# Patient Record
Sex: MALE | Race: BLACK OR AFRICAN AMERICAN | NOT HISPANIC OR LATINO | Employment: FULL TIME | URBAN - METROPOLITAN AREA
[De-identification: names, ages, dates, MRNs, and addresses within clinical notes are randomized per-mention and may not be internally consistent; named-entity substitution may affect disease eponyms.]

---

## 2022-01-06 ENCOUNTER — HOSPITAL ENCOUNTER (EMERGENCY)
Facility: HOSPITAL | Age: 32
Discharge: HOME | End: 2022-01-06
Attending: EMERGENCY MEDICINE | Admitting: EMERGENCY MEDICINE
Payer: OTHER GOVERNMENT

## 2022-01-06 ENCOUNTER — APPOINTMENT (OUTPATIENT)
Dept: RADIOLOGY | Facility: HOSPITAL | Age: 32
End: 2022-01-06
Attending: EMERGENCY MEDICINE
Payer: OTHER GOVERNMENT

## 2022-01-06 ENCOUNTER — TELEPHONE (OUTPATIENT)
Dept: EMERGENCY MEDICINE | Facility: HOSPITAL | Age: 32
End: 2022-01-06

## 2022-01-06 VITALS
TEMPERATURE: 99 F | HEART RATE: 80 BPM | BODY MASS INDEX: 28.89 KG/M2 | OXYGEN SATURATION: 99 % | SYSTOLIC BLOOD PRESSURE: 115 MMHG | DIASTOLIC BLOOD PRESSURE: 90 MMHG | HEIGHT: 68 IN | RESPIRATION RATE: 16 BRPM

## 2022-01-06 DIAGNOSIS — E03.9 HYPOTHYROIDISM, UNSPECIFIED TYPE: ICD-10-CM

## 2022-01-06 DIAGNOSIS — R07.89 ATYPICAL CHEST PAIN: ICD-10-CM

## 2022-01-06 DIAGNOSIS — J02.9 PHARYNGITIS, UNSPECIFIED ETIOLOGY: ICD-10-CM

## 2022-01-06 LAB
ALBUMIN UR-MCNC: 10 MG/DL
ANION GAP SERPL CALCULATED.3IONS-SCNC: 8 MMOL/L (ref 5–18)
APPEARANCE UR: CLEAR
BASOPHILS # BLD AUTO: 0 10E3/UL (ref 0–0.2)
BASOPHILS NFR BLD AUTO: 1 %
BILIRUB UR QL STRIP: NEGATIVE
BUN SERPL-MCNC: 8 MG/DL (ref 8–22)
CALCIUM SERPL-MCNC: 9.1 MG/DL (ref 8.5–10.5)
CHLORIDE BLD-SCNC: 105 MMOL/L (ref 98–107)
CO2 SERPL-SCNC: 28 MMOL/L (ref 22–31)
COLOR UR AUTO: ABNORMAL
CREAT SERPL-MCNC: 1.19 MG/DL (ref 0.7–1.3)
EOSINOPHIL # BLD AUTO: 0 10E3/UL (ref 0–0.7)
EOSINOPHIL NFR BLD AUTO: 0 %
ERYTHROCYTE [DISTWIDTH] IN BLOOD BY AUTOMATED COUNT: 13.1 % (ref 10–15)
FLUAV RNA SPEC QL NAA+PROBE: NEGATIVE
FLUBV RNA RESP QL NAA+PROBE: NEGATIVE
GFR SERPL CREATININE-BSD FRML MDRD: 83 ML/MIN/1.73M2
GLUCOSE BLD-MCNC: 88 MG/DL (ref 70–125)
GLUCOSE UR STRIP-MCNC: NEGATIVE MG/DL
HCT VFR BLD AUTO: 45.7 % (ref 40–53)
HGB BLD-MCNC: 15.4 G/DL (ref 13.3–17.7)
HGB UR QL STRIP: ABNORMAL
IMM GRANULOCYTES # BLD: 0 10E3/UL
IMM GRANULOCYTES NFR BLD: 0 %
KETONES UR STRIP-MCNC: NEGATIVE MG/DL
LEUKOCYTE ESTERASE UR QL STRIP: NEGATIVE
LYMPHOCYTES # BLD AUTO: 1.7 10E3/UL (ref 0.8–5.3)
LYMPHOCYTES NFR BLD AUTO: 39 %
MCH RBC QN AUTO: 32.2 PG (ref 26.5–33)
MCHC RBC AUTO-ENTMCNC: 33.7 G/DL (ref 31.5–36.5)
MCV RBC AUTO: 95 FL (ref 78–100)
MONOCYTES # BLD AUTO: 0.7 10E3/UL (ref 0–1.3)
MONOCYTES NFR BLD AUTO: 16 %
MUCOUS THREADS #/AREA URNS LPF: PRESENT /LPF
NEUTROPHILS # BLD AUTO: 1.9 10E3/UL (ref 1.6–8.3)
NEUTROPHILS NFR BLD AUTO: 44 %
NITRATE UR QL: NEGATIVE
NRBC # BLD AUTO: 0 10E3/UL
NRBC BLD AUTO-RTO: 0 /100
PH UR STRIP: 5.5 [PH] (ref 5–7)
PLATELET # BLD AUTO: 218 10E3/UL (ref 150–450)
POTASSIUM BLD-SCNC: 3.9 MMOL/L (ref 3.5–5)
RBC # BLD AUTO: 4.79 10E6/UL (ref 4.4–5.9)
RBC URINE: 1 /HPF
SARS-COV-2 RNA RESP QL NAA+PROBE: POSITIVE
SODIUM SERPL-SCNC: 141 MMOL/L (ref 136–145)
SP GR UR STRIP: 1.03 (ref 1–1.03)
T4 FREE SERPL-MCNC: 0.76 NG/DL (ref 0.7–1.8)
TROPONIN I SERPL-MCNC: <0.01 NG/ML (ref 0–0.29)
TSH SERPL DL<=0.005 MIU/L-ACNC: 25.3 UIU/ML (ref 0.3–5)
UROBILINOGEN UR STRIP-MCNC: <2 MG/DL
WBC # BLD AUTO: 4.3 10E3/UL (ref 4–11)
WBC URINE: 1 /HPF

## 2022-01-06 PROCEDURE — 84484 ASSAY OF TROPONIN QUANT: CPT | Performed by: EMERGENCY MEDICINE

## 2022-01-06 PROCEDURE — 99285 EMERGENCY DEPT VISIT HI MDM: CPT | Mod: 25

## 2022-01-06 PROCEDURE — C9803 HOPD COVID-19 SPEC COLLECT: HCPCS

## 2022-01-06 PROCEDURE — 87491 CHLMYD TRACH DNA AMP PROBE: CPT | Performed by: EMERGENCY MEDICINE

## 2022-01-06 PROCEDURE — 87591 N.GONORRHOEAE DNA AMP PROB: CPT | Performed by: EMERGENCY MEDICINE

## 2022-01-06 PROCEDURE — 87636 SARSCOV2 & INF A&B AMP PRB: CPT | Performed by: EMERGENCY MEDICINE

## 2022-01-06 PROCEDURE — 80048 BASIC METABOLIC PNL TOTAL CA: CPT | Performed by: EMERGENCY MEDICINE

## 2022-01-06 PROCEDURE — 36415 COLL VENOUS BLD VENIPUNCTURE: CPT | Performed by: EMERGENCY MEDICINE

## 2022-01-06 PROCEDURE — 93005 ELECTROCARDIOGRAM TRACING: CPT | Performed by: EMERGENCY MEDICINE

## 2022-01-06 PROCEDURE — 84439 ASSAY OF FREE THYROXINE: CPT | Performed by: EMERGENCY MEDICINE

## 2022-01-06 PROCEDURE — 71046 X-RAY EXAM CHEST 2 VIEWS: CPT

## 2022-01-06 PROCEDURE — 84443 ASSAY THYROID STIM HORMONE: CPT | Performed by: EMERGENCY MEDICINE

## 2022-01-06 PROCEDURE — 81001 URINALYSIS AUTO W/SCOPE: CPT | Performed by: EMERGENCY MEDICINE

## 2022-01-06 PROCEDURE — 85025 COMPLETE CBC W/AUTO DIFF WBC: CPT | Performed by: EMERGENCY MEDICINE

## 2022-01-06 ASSESSMENT — ENCOUNTER SYMPTOMS
DIZZINESS: 0
DYSURIA: 0
SORE THROAT: 1
ABDOMINAL PAIN: 0
NAUSEA: 0
SHORTNESS OF BREATH: 0
FEVER: 0
JOINT SWELLING: 0
CONSTIPATION: 1
CONFUSION: 0
HEMATURIA: 0
VOMITING: 0
DIARRHEA: 0
CHILLS: 0

## 2022-01-06 NOTE — DISCHARGE INSTRUCTIONS
Follow up with primary clinic. Follow up on results via mychart as we discussed. Return for worsening concerns.

## 2022-01-06 NOTE — ED TRIAGE NOTES
Patient presents to ED with left sided chest pain and back pain.  Has recently started vaping.  Son has dry cough that started 4 days ago.  No new cough for patient.  States pain with deep breaths.  Constipated.

## 2022-01-06 NOTE — ED PROVIDER NOTES
Emergency Department Encounter     Evaluation Date & Time:   No admission date for patient encounter.    CHIEF COMPLAINT:  Chest Pain      Triage Note:Patient presents to ED with left sided chest pain and back pain.  Has recently started vaping.  Son has dry cough that started 4 days ago.  No new cough for patient.  States pain with deep breaths.  Constipated.                  ED COURSE & MEDICAL DECISION MAKING:     ED Course as of 01/06/22 1706   Thu Jan 06, 2022   1449 CXR (independent interpretation): no acute cardiopulmonary process    1636 Labs overall   1658 TSH appropriately elevated, reflex T4 pending. Trop neg.   1705 Pt updated, remains quite well here. Will follow up on rest of results via [employer].  Agreeable to plan.         Pt here with multiple complaints, appears to be more anxious about potential for something developing than anything else.  He recently started using a tobacco vape pen, and reports son recently sick with sore throat.  Vitals quite reassuring, well appearing. Will get labs, CXR, but I anticipate discharge.    At the conclusion of the encounter I discussed the results of all the tests and the disposition. The questions were answered. The patient or family acknowledged understanding and was agreeable with the care plan.      MEDICATIONS GIVEN IN THE EMERGENCY DEPARTMENT:  Medications - No data to display    NEW PRESCRIPTIONS STARTED AT TODAY'S ED VISIT:  New Prescriptions    No medications on file       HPI   HPI     Felix Costa is a 31 year old male with a pertinent history of Grave's disease off his synthroid who presents to this ED for evaluation of multiple symptoms.  Primarily concerned about possibility of covid as his son recently had sore throat (negative home test), but he's been having now sore throat, nose bleed (1 time), mild cough at night, left sided chest pain that's worse with palpation of area.  Denies n/v/d, loss of sense of taste/smell. Pt also requesting  "UA/STI testing, as well as thyroid testing. He is unvaccinated for covid.    REVIEW OF SYSTEMS:  Review of Systems   Constitutional: Negative for chills and fever.   HENT: Positive for congestion and sore throat.    Eyes: Negative for visual disturbance.   Respiratory: Negative for shortness of breath.    Cardiovascular: Positive for chest pain.   Gastrointestinal: Positive for constipation. Negative for abdominal pain, diarrhea, nausea and vomiting.   Endocrine: Negative for polyuria.   Genitourinary: Negative for dysuria and hematuria.        - urinary changes     Musculoskeletal: Negative for joint swelling.   Skin: Negative for rash.   Neurological: Negative for dizziness.   Psychiatric/Behavioral: Negative for confusion.   All other systems reviewed and are negative.          Medical History   No past medical history on file.    Past Surgical History:   Procedure Laterality Date     NO PAST SURGERIES         Family History   Problem Relation Age of Onset     Diabetes Paternal Grandmother        Social History     Tobacco Use     Smoking status: Current Every Day Smoker     Smokeless tobacco: Not on file   Substance Use Topics     Alcohol use: No     Drug use: No       ondansetron (ZOFRAN) 4 MG tablet        Physical Exam     Vitals:  BP (!) 115/90   Pulse 80   Temp 99  F (37.2  C) (Oral)   Resp 16   Ht 1.727 m (5' 8\")   SpO2 99%   BMI 28.89 kg/m      PHYSICAL EXAM:   Physical Exam  Vitals and nursing note reviewed.   Constitutional:       General: He is not in acute distress.     Appearance: Normal appearance.   HENT:      Head: Normocephalic and atraumatic.      Nose: Nose normal.      Mouth/Throat:      Mouth: Mucous membranes are moist.      Pharynx: Uvula midline. No pharyngeal swelling.      Tonsils: No tonsillar exudate or tonsillar abscesses.   Eyes:      Pupils: Pupils are equal, round, and reactive to light.   Cardiovascular:      Rate and Rhythm: Normal rate and regular rhythm.      Pulses: " Normal pulses.           Radial pulses are 2+ on the right side and 2+ on the left side.        Dorsalis pedis pulses are 2+ on the right side and 2+ on the left side.   Pulmonary:      Effort: Pulmonary effort is normal. No respiratory distress.      Breath sounds: Normal breath sounds.   Chest:      Comments: TTP left anterior chest wall, no rash or deformity  Abdominal:      Palpations: Abdomen is soft.      Tenderness: There is no abdominal tenderness.   Musculoskeletal:      Cervical back: Full passive range of motion without pain and neck supple.      Comments: No calf tenderness or swelling b/l   Skin:     General: Skin is warm.      Findings: No rash.   Neurological:      General: No focal deficit present.      Mental Status: He is alert. Mental status is at baseline.      Comments: Fluent speech, no acute lateralizing deficits   Psychiatric:         Mood and Affect: Mood normal.         Behavior: Behavior normal.         Results     LAB:  All pertinent labs reviewed and interpreted  Labs Ordered and Resulted from Time of ED Arrival to Time of ED Departure   TSH WITH FREE T4 REFLEX - Abnormal       Result Value    TSH 25.30 (*)    ROUTINE UA WITH MICROSCOPIC REFLEX TO CULTURE - Abnormal    Color Urine Light Yellow      Appearance Urine Clear      Glucose Urine Negative      Bilirubin Urine Negative      Ketones Urine Negative      Specific Gravity Urine 1.027      Blood Urine 0.03 mg/dL (*)     pH Urine 5.5      Protein Albumin Urine 10  (*)     Urobilinogen Urine <2.0      Nitrite Urine Negative      Leukocyte Esterase Urine Negative      Mucus Urine Present (*)     RBC Urine 1      WBC Urine 1     BASIC METABOLIC PANEL - Normal    Sodium 141      Potassium 3.9      Chloride 105      Carbon Dioxide (CO2) 28      Anion Gap 8      Urea Nitrogen 8      Creatinine 1.19      Calcium 9.1      Glucose 88      GFR Estimate 83     TROPONIN I - Normal    Troponin I <0.01     CBC WITH PLATELETS AND DIFFERENTIAL    WBC  Count 4.3      RBC Count 4.79      Hemoglobin 15.4      Hematocrit 45.7      MCV 95      MCH 32.2      MCHC 33.7      RDW 13.1      Platelet Count 218      % Neutrophils 44      % Lymphocytes 39      % Monocytes 16      % Eosinophils 0      % Basophils 1      % Immature Granulocytes 0      NRBCs per 100 WBC 0      Absolute Neutrophils 1.9      Absolute Lymphocytes 1.7      Absolute Monocytes 0.7      Absolute Eosinophils 0.0      Absolute Basophils 0.0      Absolute Immature Granulocytes 0.0      Absolute NRBCs 0.0     INFLUENZA A/B & SARS-COV2 PCR MULTIPLEX   T4 FREE   CHLAMYDIA TRACHOMATIS PCR   NEISSERIA GONORRHOEAE PCR       RADIOLOGY:  Chest XR,  PA & LAT   Final Result   IMPRESSION: Lungs are clear. No pleural effusion. Heart size and pulmonary vascularity within normal limits.                   ECG:  Sinus aida, rate 58, normal intervals, no acute ischemia, no priors for comparison    I have independently reviewed and interpreted the EKG(s) documented above     PROCEDURES:  Procedures:      FINAL IMPRESSION:    ICD-10-CM    1. Atypical chest pain  R07.89    2. Pharyngitis, unspecified etiology  J02.9    3. Hypothyroidism, unspecified type  E03.9            Shashi Rose DO  Emergency Medicine  [facility] EMERGENCY DEPARTMENT  1/6/2022  1:34 PM         Shashi Rose MD  01/06/22 1706

## 2022-01-07 LAB
ATRIAL RATE - MUSE: 58 BPM
C TRACH DNA SPEC QL NAA+PROBE: NEGATIVE
DIASTOLIC BLOOD PRESSURE - MUSE: NORMAL MMHG
INTERPRETATION ECG - MUSE: NORMAL
N GONORRHOEA DNA SPEC QL NAA+PROBE: NEGATIVE
P AXIS - MUSE: 42 DEGREES
PR INTERVAL - MUSE: 160 MS
QRS DURATION - MUSE: 90 MS
QT - MUSE: 374 MS
QTC - MUSE: 367 MS
R AXIS - MUSE: 43 DEGREES
SYSTOLIC BLOOD PRESSURE - MUSE: NORMAL MMHG
T AXIS - MUSE: 18 DEGREES
VENTRICULAR RATE- MUSE: 58 BPM

## 2022-01-07 NOTE — TELEPHONE ENCOUNTER
Coronavirus (COVID-19) Notification    Reason for call  Notify of POSITIVE  COVID-19 lab result, assess symptoms,  review [facility] recommendations    Lab Result   Lab test for 2019-nCoV rRt-PCR or SARS-COV-2 PCR  Oropharyngeal AND/OR nasopharyngeal swabs were POSITIVE for 2019-nCoV RNA [OR] SARS-COV-2 RNA (COVID-19) RNA     We have been unable to reach Patient by phone at this time to notify of their Positive COVID-19 result.  Left voicemail message requesting a call back to [phone] [facility] for results.        POSITIVE COVID-19 Letter sent.    Maria D Duncan LPN

## 2022-06-02 ENCOUNTER — HOSPITAL ENCOUNTER (EMERGENCY)
Facility: HOSPITAL | Age: 32
Discharge: HOME | End: 2022-06-02
Admitting: NURSE PRACTITIONER

## 2022-06-02 ENCOUNTER — APPOINTMENT (OUTPATIENT)
Dept: CT IMAGING | Facility: HOSPITAL | Age: 32
End: 2022-06-02
Attending: EMERGENCY MEDICINE

## 2022-06-02 VITALS
HEART RATE: 69 BPM | WEIGHT: 190 LBS | OXYGEN SATURATION: 99 % | DIASTOLIC BLOOD PRESSURE: 78 MMHG | SYSTOLIC BLOOD PRESSURE: 127 MMHG | BODY MASS INDEX: 28.89 KG/M2 | TEMPERATURE: 97.7 F | RESPIRATION RATE: 16 BRPM

## 2022-06-02 DIAGNOSIS — N20.0 KIDNEY STONE: ICD-10-CM

## 2022-06-02 LAB
ALBUMIN UR-MCNC: NEGATIVE MG/DL
ANION GAP SERPL CALCULATED.3IONS-SCNC: 8 MMOL/L (ref 5–18)
APPEARANCE UR: CLEAR
BASOPHILS # BLD AUTO: 0.1 10E3/UL (ref 0–0.2)
BASOPHILS NFR BLD AUTO: 0 %
BILIRUB UR QL STRIP: NEGATIVE
BUN SERPL-MCNC: 15 MG/DL (ref 8–22)
CALCIUM SERPL-MCNC: 9.8 MG/DL (ref 8.5–10.5)
CHLORIDE BLD-SCNC: 105 MMOL/L (ref 98–107)
CO2 SERPL-SCNC: 26 MMOL/L (ref 22–31)
COLOR UR AUTO: ABNORMAL
CREAT SERPL-MCNC: 1.19 MG/DL (ref 0.7–1.3)
EOSINOPHIL # BLD AUTO: 0 10E3/UL (ref 0–0.7)
EOSINOPHIL NFR BLD AUTO: 0 %
ERYTHROCYTE [DISTWIDTH] IN BLOOD BY AUTOMATED COUNT: 13 % (ref 10–15)
GFR SERPL CREATININE-BSD FRML MDRD: 83 ML/MIN/1.73M2
GLUCOSE BLD-MCNC: 92 MG/DL (ref 70–125)
GLUCOSE UR STRIP-MCNC: NEGATIVE MG/DL
HCT VFR BLD AUTO: 44.9 % (ref 40–53)
HGB BLD-MCNC: 15.2 G/DL (ref 13.3–17.7)
HGB UR QL STRIP: ABNORMAL
IMM GRANULOCYTES # BLD: 0 10E3/UL
IMM GRANULOCYTES NFR BLD: 0 %
KETONES UR STRIP-MCNC: NEGATIVE MG/DL
LEUKOCYTE ESTERASE UR QL STRIP: NEGATIVE
LYMPHOCYTES # BLD AUTO: 1.1 10E3/UL (ref 0.8–5.3)
LYMPHOCYTES NFR BLD AUTO: 9 %
MCH RBC QN AUTO: 32.9 PG (ref 26.5–33)
MCHC RBC AUTO-ENTMCNC: 33.9 G/DL (ref 31.5–36.5)
MCV RBC AUTO: 97 FL (ref 78–100)
MONOCYTES # BLD AUTO: 0.5 10E3/UL (ref 0–1.3)
MONOCYTES NFR BLD AUTO: 4 %
MUCOUS THREADS #/AREA URNS LPF: PRESENT /LPF
NEUTROPHILS # BLD AUTO: 11 10E3/UL (ref 1.6–8.3)
NEUTROPHILS NFR BLD AUTO: 87 %
NITRATE UR QL: NEGATIVE
NRBC # BLD AUTO: 0 10E3/UL
NRBC BLD AUTO-RTO: 0 /100
PH UR STRIP: 6.5 [PH] (ref 5–7)
PLATELET # BLD AUTO: 287 10E3/UL (ref 150–450)
POTASSIUM BLD-SCNC: 3.7 MMOL/L (ref 3.5–5)
RBC # BLD AUTO: 4.62 10E6/UL (ref 4.4–5.9)
RBC URINE: 3 /HPF
SODIUM SERPL-SCNC: 139 MMOL/L (ref 136–145)
SP GR UR STRIP: 1.02 (ref 1–1.03)
SQUAMOUS EPITHELIAL: <1 /HPF
UROBILINOGEN UR STRIP-MCNC: <2 MG/DL
WBC # BLD AUTO: 12.6 10E3/UL (ref 4–11)
WBC URINE: 1 /HPF

## 2022-06-02 PROCEDURE — 81001 URINALYSIS AUTO W/SCOPE: CPT | Performed by: EMERGENCY MEDICINE

## 2022-06-02 PROCEDURE — 80048 BASIC METABOLIC PNL TOTAL CA: CPT | Performed by: EMERGENCY MEDICINE

## 2022-06-02 PROCEDURE — 85025 COMPLETE CBC W/AUTO DIFF WBC: CPT | Performed by: EMERGENCY MEDICINE

## 2022-06-02 PROCEDURE — 36415 COLL VENOUS BLD VENIPUNCTURE: CPT | Performed by: EMERGENCY MEDICINE

## 2022-06-02 PROCEDURE — 74176 CT ABD & PELVIS W/O CONTRAST: CPT

## 2022-06-02 PROCEDURE — 99284 EMERGENCY DEPT VISIT MOD MDM: CPT | Mod: 25

## 2022-06-02 RX ORDER — ACETAMINOPHEN 500 MG
1000 TABLET ORAL EVERY 6 HOURS
Qty: 56 TABLET | Refills: 0 | Status: SHIPPED | OUTPATIENT
Start: 2022-06-02 | End: 2022-06-09

## 2022-06-02 RX ORDER — IBUPROFEN 200 MG
400 TABLET ORAL EVERY 6 HOURS
Qty: 56 TABLET | Refills: 0 | Status: SHIPPED | OUTPATIENT
Start: 2022-06-02 | End: 2022-06-09

## 2022-06-02 RX ORDER — DIMENHYDRINATE 50 MG
50 TABLET ORAL EVERY 6 HOURS PRN
Qty: 28 TABLET | Refills: 0 | Status: SHIPPED | OUTPATIENT
Start: 2022-06-02 | End: 2022-06-09

## 2022-06-02 RX ORDER — OXYCODONE HYDROCHLORIDE 5 MG/1
5 TABLET ORAL EVERY 4 HOURS PRN
Qty: 12 TABLET | Refills: 0 | Status: SHIPPED | OUTPATIENT
Start: 2022-06-02 | End: 2022-06-06

## 2022-06-02 RX ORDER — ONDANSETRON 4 MG/1
4 TABLET, ORALLY DISINTEGRATING ORAL EVERY 8 HOURS PRN
Qty: 10 TABLET | Refills: 0 | Status: SHIPPED | OUTPATIENT
Start: 2022-06-02 | End: 2022-06-06

## 2022-06-02 RX ORDER — ONDANSETRON 2 MG/ML
4 INJECTION INTRAMUSCULAR; INTRAVENOUS ONCE
Status: DISCONTINUED | OUTPATIENT
Start: 2022-06-02 | End: 2022-06-02 | Stop reason: HOSPADM

## 2022-06-02 RX ORDER — KETOROLAC TROMETHAMINE 30 MG/ML
15 INJECTION, SOLUTION INTRAMUSCULAR; INTRAVENOUS ONCE
Status: DISCONTINUED | OUTPATIENT
Start: 2022-06-02 | End: 2022-06-02 | Stop reason: HOSPADM

## 2022-06-02 ASSESSMENT — ENCOUNTER SYMPTOMS
DIAPHORESIS: 1
FLANK PAIN: 1
NAUSEA: 1
VOMITING: 0
DYSURIA: 0
ABDOMINAL PAIN: 1
HEMATURIA: 0

## 2022-06-02 NOTE — ED NOTES
ED Provider In Triage Note  [facility]  Encounter Date: Jun 2, 2022    Chief Complaint   Patient presents with     Flank Pain       Brief HPI:   Felix Costa is a 31 year old male presenting to the Emergency Department with a chief complaint of acute onset right flank/groin pain radiating into testicle around 10:15am today.  No treatment pta.  Reports n/v and symptoms similar to previous ureteral stone.      Brief Physical Exam:  /72   Pulse 62   Temp 97.7  F (36.5  C) (Tympanic)   Resp 12   Wt 86.2 kg (190 lb)   SpO2 100%   BMI 28.89 kg/m    General: Non-toxic appearing  HEENT: Atraumatic  Resp: No respiratory distress  Abdomen: Non-peritoneal  : no testicular tenderness/swelling  Neuro: Alert, oriented, answers questions appropriately  Psych: Behavior appropriate      Plan Initiated in Triage:  Orders Placed This Encounter     CT Abdomen Pelvis w/o Contrast     Basic metabolic panel     UA with Microscopic reflex to Culture     ketorolac (TORADOL) injection 15 mg     ondansetron (ZOFRAN) injection 4 mg       PIT Dispo:   Return to lobby while awaiting workup and ED bed availability.  No signs of testicular torsion    Shashi Rose MD on 6/2/2022 at 11:45 AM    Patient was evaluated by the Physician in Triage due to a limitation of available rooms in the Emergency Department. A plan of care was discussed based on the information obtained on the initial evaluation and patient was consuled to return back to the Emergency Department lobby after this initial evalutaiton until results were obtained or a room became available in the Emergency Department. Patient was counseled not to leave prior to receiving the results of their workup.     Shashi Rose MD  [facility] EMERGENCY DEPARTMENT  [address] [place] [id number]  [phone]     Shashi Rose MD  06/02/22 1148

## 2022-06-02 NOTE — DISCHARGE INSTRUCTIONS
CT scan today is showing the stone in your bladder.    You should take ibuprofen, 600mg, threetimes per day as needed for pain.  You can also use acetaminophen, 650 mg, up to four times per day as needed for pain.  You can use these medications together, there are no concerning interactions.      Take Zofran as needed for any ongoing nausea    Follow-up with your primary care doctor in 1 week for recheck.    Return for any new / worsening symptoms or for other concerns.

## 2022-06-02 NOTE — ED PROVIDER NOTES
EMERGENCY DEPARTMENT ENCOUNTER      NAME: Felix Costa  AGE: 31 year old male  YOB: 1990  MRN: [id number]  EVALUATION DATE & TIME: No admission date for patient encounter.    PCP: Sarabjit Carter    ED PROVIDER: LEE Valderrama, CNP      Chief Complaint   Patient presents with     Flank Pain         FINAL IMPRESSION:  1. Kidney stone          ED COURSE & MEDICAL DECISION MAKIN:07 PM I met with the patient, obtained history, performed an initial exam, and discussed options and plan for treatment here in the ED.  3:33 PM updated patient    Pertinent Labs & Imaging studies reviewed. (See chart for details)  31 year old male presents to the Emergency Department for evaluation of right flank pain.  CT of the abdomen and pelvis shows stone in the bladder.  Having some residual pain but overall seems comfortable.  UA does not suggest any infection.  Discussed expectant management given that the stone is in the bladder.  Will be sent home from the ED with a urine strainer.  Referral to [facility] was provided but may follow-up with PCP.  Additionally given return precautions    At the conclusion of the encounter I discussed the results of all of the tests and the disposition. The questions were answered. The patient or family acknowledged understanding and was agreeable with the care plan.         MEDICATIONS GIVEN IN THE EMERGENCY:  Medications   ketorolac (TORADOL) injection 15 mg (has no administration in time range)   ondansetron (ZOFRAN) injection 4 mg (has no administration in time range)       NEW PRESCRIPTIONS STARTED AT TODAY'S ER VISIT  New Prescriptions    ACETAMINOPHEN (TYLENOL) 500 MG TABLET    Take 2 tablets (1,000 mg) by mouth every 6 hours for 7 days    DIMENHYDRINATE (DRAMAMINE) 50 MG TABLET    Take 1 tablet (50 mg) by mouth every 6 hours as needed for other (kidney stone pain management)    IBUPROFEN (ADVIL/MOTRIN) 200 MG TABLET    Take 2 tablets (400 mg) by mouth every 6 hours  for 7 days    OXYCODONE (ROXICODONE) 5 MG TABLET    Take 1 tablet (5 mg) by mouth every 4 hours as needed for severe pain If pain is not improved with acetaminophen and ibuprofen.            =================================================================    HPI    Patient information was obtained from: Patient    Use of Intrepreter: N/A         Felix Costa is a 31 year old male with a history of kidney stones, Graves' disease, postablative hypothyroidism, and kidney stone, who presents with flank pain.    Patient reports pain to his lower right abdomen radiating into his back on the right side starting at 10:30 AM today while he was driving home. Patient reports he got home and laid down, he could not get back up due to the pain, and he could not get in a comfortable position. He endorses getting hot, sweaty and nauseous at the worst point. He notes that since arriving to the ED, the pain radiated to his right testicle. Patient notes he had kidney stones in 2017 and this feels similar. He endorses he passed the stones on his own that time, but was diagnosed with Graves' disease. Patient denies vomiting, urinary problems, or any other current complaints.    REVIEW OF SYSTEMS   Review of Systems   Constitutional: Positive for diaphoresis.   Gastrointestinal: Positive for abdominal pain and nausea. Negative for vomiting.   Genitourinary: Positive for flank pain and testicular pain. Negative for dysuria and hematuria.   All other systems reviewed and are negative.       PAST MEDICAL HISTORY:  No past medical history on file.    PAST SURGICAL HISTORY:  Past Surgical History:   Procedure Laterality Date     NO PAST SURGERIES             CURRENT MEDICATIONS:    Cannot display prior to admission medications because the patient has not been admitted in this contact.           ALLERGIES:  Allergies   Allergen Reactions     Peanut [Peanut Oil] Hives     Prochlorperazine Anxiety       FAMILY HISTORY:  Family History    Problem Relation Age of Onset     Diabetes Paternal Grandmother        SOCIAL HISTORY:   Social History     Socioeconomic History     Marital status: Single     Number of children: 1   Tobacco Use     Smoking status: Current Every Day Smoker   Substance and Sexual Activity     Alcohol use: No     Drug use: No         VITALS:  Patient Vitals for the past 24 hrs:   BP Temp Temp src Pulse Resp SpO2 Weight   06/02/22 1303 119/59 -- -- 59 18 99 % --   06/02/22 1138 135/72 97.7  F (36.5  C) Tympanic 62 12 100 % 86.2 kg (190 lb)       PHYSICAL EXAM    Constitutional:  Well developed, well nourished, no acute distress  EYES: Conjunctivae clear  HENT:  Atraumatic, normocephalic  Respiratory:  No respiratory distress, normal breath sounds  Cardiovascular:  Normal rate, normal rhythm, no murmurs  GI:  Soft, nondistended, RLQ tenderness. No CVA tenderness  Integument: Warm, Dry  Neurologic:  Alert & oriented x 3              LAB:  All pertinent labs reviewed and interpreted.  Labs Ordered and Resulted from Time of ED Arrival to Time of ED Departure   ROUTINE UA WITH MICROSCOPIC REFLEX TO CULTURE - Abnormal       Result Value    Color Urine Light Yellow      Appearance Urine Clear      Glucose Urine Negative      Bilirubin Urine Negative      Ketones Urine Negative      Specific Gravity Urine 1.021      Blood Urine 0.5 mg/dL (*)     pH Urine 6.5      Protein Albumin Urine Negative      Urobilinogen Urine <2.0      Nitrite Urine Negative      Leukocyte Esterase Urine Negative      Mucus Urine Present (*)     RBC Urine 3 (*)     WBC Urine 1      Squamous Epithelials Urine <1     CBC WITH PLATELETS AND DIFFERENTIAL - Abnormal    WBC Count 12.6 (*)     RBC Count 4.62      Hemoglobin 15.2      Hematocrit 44.9      MCV 97      MCH 32.9      MCHC 33.9      RDW 13.0      Platelet Count 287      % Neutrophils 87      % Lymphocytes 9      % Monocytes 4      % Eosinophils 0      % Basophils 0      % Immature Granulocytes 0      NRBCs per  100 WBC 0      Absolute Neutrophils 11.0 (*)     Absolute Lymphocytes 1.1      Absolute Monocytes 0.5      Absolute Eosinophils 0.0      Absolute Basophils 0.1      Absolute Immature Granulocytes 0.0      Absolute NRBCs 0.0     BASIC METABOLIC PANEL - Normal    Sodium 139      Potassium 3.7      Chloride 105      Carbon Dioxide (CO2) 26      Anion Gap 8      Urea Nitrogen 15      Creatinine 1.19      Calcium 9.8      Glucose 92      GFR Estimate 83           RADIOLOGY:  Reviewed all pertinent imaging. Please see official radiology report.  CT Abdomen Pelvis w/o Contrast   Final Result   IMPRESSION:    1.  Urinary bladder stone measuring 1 mm located dependently just right of midline near the ureterovesical junction compatible with a recently passed ureteral stone. Mild right pelviectasis without vicki hydronephrosis.   2.  Several nonobstructing renal stones bilaterally.                   PROCEDURES:   None      I, Maura Gay, am serving as a scribe to document services personally performed by Shashi Gonzalez CNP. based on my observation and the provider's statements to me. IShashi CNP attest that Maura Gay is acting in a scribe capacity, has observed my performance of the services and has documented them in accordance with my direction.    LEE Valderrama, CNP  Emergency Medicine  [facility] EMERGENCY DEPARTMENT  [address] [facility] [id number] [phone]  Dept: [phone]         Shashi Gonzalez APRN CNP  06/02/22 1704

## 2022-06-02 NOTE — ED TRIAGE NOTES
Pt developed right flankand right abdominal pain that goes into the right testicle.  Testicle not swollen per dr limon.  Pt has pain 10/10. Nausea.       Francoise Ortega(Resident)

## 2022-06-06 ENCOUNTER — VIRTUAL VISIT (OUTPATIENT)
Dept: UROLOGY | Facility: CLINIC | Age: 32
End: 2022-06-06

## 2022-06-06 DIAGNOSIS — N20.0 KIDNEY STONE: ICD-10-CM

## 2022-06-06 DIAGNOSIS — N13.2 HYDRONEPHROSIS WITH URINARY OBSTRUCTION DUE TO URETERAL CALCULUS: ICD-10-CM

## 2022-06-06 DIAGNOSIS — M54.50 ACUTE BILATERAL LOW BACK PAIN WITHOUT SCIATICA: ICD-10-CM

## 2022-06-06 DIAGNOSIS — N20.0 CALCULUS OF KIDNEY: ICD-10-CM

## 2022-06-06 DIAGNOSIS — N20.1 CALCULUS OF URETER: Primary | ICD-10-CM

## 2022-06-06 PROCEDURE — 99203 OFFICE O/P NEW LOW 30 MIN: CPT | Mod: 95 | Performed by: PHYSICIAN ASSISTANT

## 2022-06-06 RX ORDER — LEVOTHYROXINE SODIUM 125 MCG
TABLET ORAL
COMMUNITY
Start: 2022-05-05

## 2022-06-06 ASSESSMENT — PAIN SCALES - GENERAL: PAINLEVEL: SEVERE PAIN (6)

## 2022-06-06 NOTE — PROGRESS NOTES
Assessment/Plan:    Assessment & Plan   Felix was seen today for new patient.    Diagnoses and all orders for this visit:    Calculus of ureter    Hydronephrosis with urinary obstruction due to ureteral calculus    Calculus of kidney    Acute bilateral low back pain without sciatica    Stone Management Plan  Stone Management 6/6/2022   Urinary Tract Infection No suspicion of infection   Renal Colic Asymptomatic at this time   Renal Failure No suspicion of renal failure   Current CT date 6/2/2022   Right sided stones? Yes   R Number of ureteral stones 1   R GSD of ureteral stones 1   R Location of ureteral stone Distal   R Number of kidney stones  5   R GSD of kidney stones 2 - 4   R Hydronephrosis Mild   R Stone Event New stone passed prior to visit   Diagnosis date 6/2/2022   Initial location of primary symptomatic stone Distal   Initial GSD of primary symptomatic stone 1   Resolved Date 6/6/2022   R Current Plan Observe   Observe rationale Limited stone burden with good prognosis for spontaneous passage   Left sided stones? Yes   L Number of ureteral stones No ureteral stones   L Number of kidney stones  3   L GSD of kidney stones < 2   L Hydronephrosis None   L Stone Event No current event   L Current Plan Observe   Observe rationale Limited stone burden with good prognosis for spontaneous passage         PLAN    32 yo M with history of kidney stones, no prior surgical stone interventions. Recent ER visit for abdominal pain with tiny right distal ureteral stone vs bladder stone. Multiple, small, bilateral intrarenal stones. Diffuse, lower back pain, etiology unclear but likely musculoskeletal.    Will initiate comprehensive stone risk evaluation. Serum stone risk chemistries including parathyroid hormone and ionized calcium will be obtained before next visit. Two 24 hour urine collections and dietary journal will be obtained at earliest covenience. Patient verbalized understanding. Patient agrees with plan as  discussed. He will return to clinic when labs are available.      For symptom control, he was prescribed oxycodone and ondansetron from ER. Over the counter symptom control medications of ibuprofen, Dramamine and Tylenol were recommended.    Telephone call duration: 18 minutes  22 minutes spent on the date of the encounter doing chart review, history and exam, documentation and further activities per the note    Thelma Zambrano PA-C  [facility]    Subjective:     HPI  Mr. Felix Costa is a 31 year old  male who is being evaluated via a billable telephone visit by [facility] following [facility] ER visit for urolithiasis.    He is an unidentified composition stone former who has not required stone clearance procedures. He has not previously participated in stone risk evaluation. He has no identified modifiable stone risk factors. He has no identified non-modifiable stone risk factors.    He was evaluated in ER 6/2/22 for acute onset right lower abdominal pain, starting 1 hour prior to arrival. The pain was constant, sharp, and radiated into the right flank/side. He had associated sweats, nausea and testicular pain. He reported history of kidney stones in 2016 with similar symptoms. Workup confirmed a recently passed bladder stone and bilateral renal stones. He was sent home with zofran and oxycodone.    He is still experiencing difficult urination with decreased output and intermittent diffuse lower back pain. He feels like he has to crack his back but can't relieve the pain. It typically occurs overnight, awakening him from sleep. Deep inspiration aggravates the pain. He notes he has palpable mass/lumps in his back, which he has previously addressed with PCP. He had a stone in 2016, which he passed. Pertinent negative current symptoms include:  fever, chills, left flank pain, nausea, vomiting, hematuria, urinary frequency and dysuria.     CT  scan from 6/2/22 is personally reviewed and demonstrates a 1 mm in extreme distal right ureter versus bladder. Mild right hydronephrosis. Multiple small nonobstructing bilateral renal stones, largest 2 mm.    Significant labs from presentation include mild hematuria, no pyuria, negative nitrite, slightly elevated WBC, normal creatinine and normal potassium.    ROS   A 12 point comprehensive review of systems is negative except for HPI    Past Medical History:   Diagnosis Date     Graves disease 06/2016     Kidney stone 06/2016     Past Surgical History:   Procedure Laterality Date     NO PAST SURGERIES       Current Outpatient Medications   Medication Sig Dispense Refill     acetaminophen (TYLENOL) 500 MG tablet Take 2 tablets (1,000 mg) by mouth every 6 hours for 7 days 56 tablet 0     dimenhyDRINATE (DRAMAMINE) 50 MG tablet Take 1 tablet (50 mg) by mouth every 6 hours as needed for other (kidney stone pain management) 28 tablet 0     ibuprofen (ADVIL/MOTRIN) 200 MG tablet Take 2 tablets (400 mg) by mouth every 6 hours for 7 days 56 tablet 0     ondansetron (ZOFRAN) 4 MG tablet [ONDANSETRON (ZOFRAN) 4 MG TABLET] Take 1 tablet (4 mg total) by mouth every 6 (six) hours. 12 tablet 0     oxyCODONE (ROXICODONE) 5 MG tablet Take 1 tablet (5 mg) by mouth every 4 hours as needed for severe pain If pain is not improved with acetaminophen and ibuprofen. 12 tablet 0     SYNTHROID 125 MCG tablet        Allergies   Allergen Reactions     Peanut [Peanut Oil] Hives     Prochlorperazine Anxiety     Social History     Socioeconomic History     Marital status: Single     Spouse name: Not on file     Number of children: 1     Years of education: Not on file     Highest education level: Not on file   Occupational History     Not on file   Tobacco Use     Smoking status: Current Every Day Smoker     Smokeless tobacco: Not on file   Substance and Sexual Activity     Alcohol use: No     Drug use: No     Sexual activity: Not on file    Other Topics Concern     Not on file   Social History Narrative     Not on file     Social Determinants of Health     Financial Resource Strain: Not on file   Food Insecurity: Not on file   Transportation Needs: Not on file   Physical Activity: Not on file   Stress: Not on file   Social Connections: Not on file   Intimate Partner Violence: Not on file   Housing Stability: Not on file       Family History   Problem Relation Age of Onset     Diabetes Paternal Grandmother        Objective:     No vitals or physical exam obtained due to virtual visit    LABS  Most Recent 3 CBC's:  Recent Labs   Lab Test 06/02/22  1413 01/06/22  1540 03/02/19  2120   WBC 12.6* 4.3 6.5   HGB 15.2 15.4 15.1   MCV 97 95 94    218 219     Most Recent 3 BMP's:  Recent Labs   Lab Test 06/02/22  1413 01/06/22  1540 03/02/19  2120    141 141   POTASSIUM 3.7 3.9 3.3*   CHLORIDE 105 105 106   CO2 26 28 28   BUN 15 8 13   CR 1.19 1.19 1.14   ANIONGAP 8 8 7   ADBIRASHID 9.8 9.1 8.8   GLC 92 88 78     Most Recent Urinalysis:  Recent Labs   Lab Test 06/02/22  1458   COLOR Light Yellow   APPEARANCE Clear   URINEGLC Negative   URINEBILI Negative   URINEKETONE Negative   SG 1.021   UBLD 0.5 mg/dL*   URINEPH 6.5   PROTEIN Negative   NITRITE Negative   LEUKEST Negative   RBCU 3*   WBCU 1

## 2022-06-06 NOTE — PROGRESS NOTES
Patient is roomed via telephone for a virtual visit.  Patient confirmed he is in the [facility] at the time of this appointment.  Patient understands that this virtual visit is billable and agree to proceed with appointment.

## 2022-06-07 ENCOUNTER — TELEPHONE (OUTPATIENT)
Dept: UROLOGY | Facility: CLINIC | Age: 32
End: 2022-06-07

## 2023-04-24 ENCOUNTER — APPOINTMENT (OUTPATIENT)
Dept: CT IMAGING | Facility: HOSPITAL | Age: 33
End: 2023-04-24
Attending: EMERGENCY MEDICINE

## 2023-04-24 ENCOUNTER — HOSPITAL ENCOUNTER (EMERGENCY)
Facility: HOSPITAL | Age: 33
Discharge: HOME | End: 2023-04-24
Attending: EMERGENCY MEDICINE | Admitting: EMERGENCY MEDICINE

## 2023-04-24 VITALS
TEMPERATURE: 98.7 F | HEIGHT: 70 IN | SYSTOLIC BLOOD PRESSURE: 107 MMHG | BODY MASS INDEX: 25.48 KG/M2 | HEART RATE: 49 BPM | RESPIRATION RATE: 16 BRPM | OXYGEN SATURATION: 98 % | DIASTOLIC BLOOD PRESSURE: 66 MMHG | WEIGHT: 178 LBS

## 2023-04-24 DIAGNOSIS — N20.1 URETEROLITHIASIS: ICD-10-CM

## 2023-04-24 DIAGNOSIS — E03.9 HYPOTHYROIDISM, UNSPECIFIED TYPE: ICD-10-CM

## 2023-04-24 PROBLEM — F41.9 ANXIETY AND DEPRESSION: Status: ACTIVE | Noted: 2020-08-17

## 2023-04-24 PROBLEM — Z86.39 HX OF GRAVES' DISEASE: Status: ACTIVE | Noted: 2023-04-24

## 2023-04-24 PROBLEM — E89.0 POSTABLATIVE HYPOTHYROIDISM: Status: ACTIVE | Noted: 2017-09-11

## 2023-04-24 PROBLEM — F17.200 SMOKER: Status: ACTIVE | Noted: 2023-04-24

## 2023-04-24 PROBLEM — K62.5 BRBPR (BRIGHT RED BLOOD PER RECTUM): Status: ACTIVE | Noted: 2021-05-05

## 2023-04-24 PROBLEM — M54.50 ACUTE BILATERAL LOW BACK PAIN WITHOUT SCIATICA: Status: ACTIVE | Noted: 2021-05-05

## 2023-04-24 PROBLEM — N20.0 NEPHROLITHIASIS: Status: ACTIVE | Noted: 2023-04-24

## 2023-04-24 PROBLEM — F32.A ANXIETY AND DEPRESSION: Status: ACTIVE | Noted: 2020-08-17

## 2023-04-24 PROBLEM — D22.61: Status: ACTIVE | Noted: 2020-03-13

## 2023-04-24 LAB
ALBUMIN UR-MCNC: NEGATIVE MG/DL
ANION GAP SERPL CALCULATED.3IONS-SCNC: 10 MMOL/L (ref 7–15)
APPEARANCE UR: CLEAR
BASOPHILS # BLD AUTO: 0.1 10E3/UL (ref 0–0.2)
BASOPHILS NFR BLD AUTO: 1 %
BILIRUB UR QL STRIP: NEGATIVE
BUN SERPL-MCNC: 15 MG/DL (ref 6–20)
CALCIUM SERPL-MCNC: 9.5 MG/DL (ref 8.6–10)
CHLORIDE SERPL-SCNC: 103 MMOL/L (ref 98–107)
COLOR UR AUTO: ABNORMAL
CREAT SERPL-MCNC: 1.29 MG/DL (ref 0.67–1.17)
DEPRECATED HCO3 PLAS-SCNC: 27 MMOL/L (ref 22–29)
EOSINOPHIL # BLD AUTO: 0.2 10E3/UL (ref 0–0.7)
EOSINOPHIL NFR BLD AUTO: 2 %
ERYTHROCYTE [DISTWIDTH] IN BLOOD BY AUTOMATED COUNT: 13.2 % (ref 10–15)
GFR SERPL CREATININE-BSD FRML MDRD: 75 ML/MIN/1.73M2
GLUCOSE SERPL-MCNC: 100 MG/DL (ref 70–99)
GLUCOSE UR STRIP-MCNC: NEGATIVE MG/DL
HCT VFR BLD AUTO: 44.6 % (ref 40–53)
HGB BLD-MCNC: 15 G/DL (ref 13.3–17.7)
HGB UR QL STRIP: ABNORMAL
IMM GRANULOCYTES # BLD: 0 10E3/UL
IMM GRANULOCYTES NFR BLD: 0 %
KETONES UR STRIP-MCNC: NEGATIVE MG/DL
LEUKOCYTE ESTERASE UR QL STRIP: NEGATIVE
LYMPHOCYTES # BLD AUTO: 2.1 10E3/UL (ref 0.8–5.3)
LYMPHOCYTES NFR BLD AUTO: 24 %
MCH RBC QN AUTO: 32.3 PG (ref 26.5–33)
MCHC RBC AUTO-ENTMCNC: 33.6 G/DL (ref 31.5–36.5)
MCV RBC AUTO: 96 FL (ref 78–100)
MONOCYTES # BLD AUTO: 0.6 10E3/UL (ref 0–1.3)
MONOCYTES NFR BLD AUTO: 7 %
MUCOUS THREADS #/AREA URNS LPF: PRESENT /LPF
NEUTROPHILS # BLD AUTO: 5.9 10E3/UL (ref 1.6–8.3)
NEUTROPHILS NFR BLD AUTO: 66 %
NITRATE UR QL: NEGATIVE
NRBC # BLD AUTO: 0 10E3/UL
NRBC BLD AUTO-RTO: 0 /100
PH UR STRIP: 5.5 [PH] (ref 5–7)
PLATELET # BLD AUTO: 253 10E3/UL (ref 150–450)
POTASSIUM SERPL-SCNC: 4.2 MMOL/L (ref 3.4–5.3)
RBC # BLD AUTO: 4.65 10E6/UL (ref 4.4–5.9)
RBC URINE: 77 /HPF
SODIUM SERPL-SCNC: 140 MMOL/L (ref 136–145)
SP GR UR STRIP: 1.02 (ref 1–1.03)
SQUAMOUS EPITHELIAL: <1 /HPF
T4 FREE SERPL-MCNC: 0.84 NG/DL (ref 0.9–1.7)
TSH SERPL DL<=0.005 MIU/L-ACNC: 39.71 UIU/ML (ref 0.3–4.2)
UROBILINOGEN UR STRIP-MCNC: <2 MG/DL
WBC # BLD AUTO: 8.9 10E3/UL (ref 4–11)
WBC URINE: 3 /HPF

## 2023-04-24 PROCEDURE — 99285 EMERGENCY DEPT VISIT HI MDM: CPT | Mod: 25

## 2023-04-24 PROCEDURE — 80048 BASIC METABOLIC PNL TOTAL CA: CPT | Performed by: EMERGENCY MEDICINE

## 2023-04-24 PROCEDURE — 258N000003 HC RX IP 258 OP 636: Performed by: EMERGENCY MEDICINE

## 2023-04-24 PROCEDURE — 96376 TX/PRO/DX INJ SAME DRUG ADON: CPT

## 2023-04-24 PROCEDURE — 250N000013 HC RX MED GY IP 250 OP 250 PS 637: Performed by: EMERGENCY MEDICINE

## 2023-04-24 PROCEDURE — 96375 TX/PRO/DX INJ NEW DRUG ADDON: CPT

## 2023-04-24 PROCEDURE — 81001 URINALYSIS AUTO W/SCOPE: CPT | Performed by: EMERGENCY MEDICINE

## 2023-04-24 PROCEDURE — 250N000011 HC RX IP 250 OP 636: Performed by: EMERGENCY MEDICINE

## 2023-04-24 PROCEDURE — 96361 HYDRATE IV INFUSION ADD-ON: CPT

## 2023-04-24 PROCEDURE — 84443 ASSAY THYROID STIM HORMONE: CPT | Performed by: EMERGENCY MEDICINE

## 2023-04-24 PROCEDURE — 85025 COMPLETE CBC W/AUTO DIFF WBC: CPT | Performed by: EMERGENCY MEDICINE

## 2023-04-24 PROCEDURE — 74176 CT ABD & PELVIS W/O CONTRAST: CPT

## 2023-04-24 PROCEDURE — 36415 COLL VENOUS BLD VENIPUNCTURE: CPT | Performed by: EMERGENCY MEDICINE

## 2023-04-24 PROCEDURE — 96374 THER/PROPH/DIAG INJ IV PUSH: CPT

## 2023-04-24 PROCEDURE — 84439 ASSAY OF FREE THYROXINE: CPT | Performed by: EMERGENCY MEDICINE

## 2023-04-24 RX ORDER — LEVOTHYROXINE SODIUM 125 UG/1
125 TABLET ORAL DAILY
Qty: 14 TABLET | Refills: 0 | Status: SHIPPED | OUTPATIENT
Start: 2023-04-24 | End: 2023-05-08

## 2023-04-24 RX ORDER — IBUPROFEN 200 MG
400 TABLET ORAL EVERY 6 HOURS
Qty: 56 TABLET | Refills: 0 | Status: SHIPPED | OUTPATIENT
Start: 2023-04-24 | End: 2023-05-01

## 2023-04-24 RX ORDER — ACETAMINOPHEN 500 MG
1000 TABLET ORAL EVERY 6 HOURS
Qty: 56 TABLET | Refills: 0 | Status: SHIPPED | OUTPATIENT
Start: 2023-04-24 | End: 2023-05-01

## 2023-04-24 RX ORDER — KETOROLAC TROMETHAMINE 15 MG/ML
15 INJECTION, SOLUTION INTRAMUSCULAR; INTRAVENOUS ONCE
Status: COMPLETED | OUTPATIENT
Start: 2023-04-24 | End: 2023-04-24

## 2023-04-24 RX ORDER — ONDANSETRON 2 MG/ML
4 INJECTION INTRAMUSCULAR; INTRAVENOUS ONCE
Status: COMPLETED | OUTPATIENT
Start: 2023-04-24 | End: 2023-04-24

## 2023-04-24 RX ORDER — DIMENHYDRINATE 50 MG
50 TABLET ORAL AT BEDTIME
Qty: 7 TABLET | Refills: 0 | Status: SHIPPED | OUTPATIENT
Start: 2023-04-24 | End: 2023-05-01

## 2023-04-24 RX ORDER — OXYCODONE AND ACETAMINOPHEN 5; 325 MG/1; MG/1
2 TABLET ORAL ONCE
Status: COMPLETED | OUTPATIENT
Start: 2023-04-24 | End: 2023-04-24

## 2023-04-24 RX ORDER — LEVOTHYROXINE SODIUM 125 UG/1
125 TABLET ORAL ONCE
Status: COMPLETED | OUTPATIENT
Start: 2023-04-24 | End: 2023-04-24

## 2023-04-24 RX ORDER — OXYCODONE HYDROCHLORIDE 5 MG/1
5 TABLET ORAL EVERY 4 HOURS PRN
Qty: 12 TABLET | Refills: 0 | Status: SHIPPED | OUTPATIENT
Start: 2023-04-24 | End: 2023-04-28

## 2023-04-24 RX ORDER — DIMENHYDRINATE 50 MG
50 TABLET ORAL EVERY 6 HOURS PRN
Qty: 28 TABLET | Refills: 0 | Status: SHIPPED | OUTPATIENT
Start: 2023-04-24 | End: 2023-05-01

## 2023-04-24 RX ORDER — HYDROMORPHONE HYDROCHLORIDE 1 MG/ML
0.5 INJECTION, SOLUTION INTRAMUSCULAR; INTRAVENOUS; SUBCUTANEOUS ONCE
Status: COMPLETED | OUTPATIENT
Start: 2023-04-24 | End: 2023-04-24

## 2023-04-24 RX ADMIN — LEVOTHYROXINE SODIUM 125 MCG: 125 TABLET ORAL at 04:14

## 2023-04-24 RX ADMIN — HYDROMORPHONE HYDROCHLORIDE 0.5 MG: 1 INJECTION, SOLUTION INTRAMUSCULAR; INTRAVENOUS; SUBCUTANEOUS at 03:35

## 2023-04-24 RX ADMIN — KETOROLAC TROMETHAMINE 15 MG: 15 INJECTION, SOLUTION INTRAMUSCULAR; INTRAVENOUS at 02:24

## 2023-04-24 RX ADMIN — SODIUM CHLORIDE 1000 ML: 9 INJECTION, SOLUTION INTRAVENOUS at 02:46

## 2023-04-24 RX ADMIN — HYDROMORPHONE HYDROCHLORIDE 1 MG: 1 INJECTION, SOLUTION INTRAMUSCULAR; INTRAVENOUS; SUBCUTANEOUS at 02:25

## 2023-04-24 RX ADMIN — ONDANSETRON 4 MG: 2 INJECTION INTRAMUSCULAR; INTRAVENOUS at 02:25

## 2023-04-24 ASSESSMENT — ENCOUNTER SYMPTOMS
FLANK PAIN: 1
DIFFICULTY URINATING: 1

## 2023-04-24 ASSESSMENT — ACTIVITIES OF DAILY LIVING (ADL)
ADLS_ACUITY_SCORE: 35
ADLS_ACUITY_SCORE: 35

## 2023-04-24 NOTE — DISCHARGE INSTRUCTIONS
In additionTake medications as prescribed for management of your symptoms associated with the obstructing kidney stone.  Can, I do recommend restarting her hypothyroid medication.  Please follow-up with your endocrinologist call tomorrow for discussion on the thyroid test performed today and for additional follow-up scheduled this week.  If you develop fever your pain is not controlled or you have escalating symptomatology return to emergency department for repeat assessment.

## 2023-04-24 NOTE — ED NOTES
Progress Note    The patient was signed out to me by Dr. Jeremi Doherty.    Brief HPI: Felix Costa is a 32 year old male who presented for flank pain. Patient started to develop severe left sided flank pain radiating into his penis since yesterday. He notes associated urinary retention. He has been noncompliant with his thyroid medications for the past year. UA pending at time of sign out.         Urinalysis did not show any signs of infection.  Discharged.    Final diagnoses:   Ureterolithiasis   Hypothyroidism, unspecified type       I, Ciaran Garcia, am serving as a scribe to document services personally performed by Dr. Jules Thomas, based on my observations and the provider's statements to me.  I, Jules Thomas MD, attest that Ciaran Garcia is acting in a scribe capacity, has observed my performance of the services and has documented them in accordance with my direction.     Jules Thomas MD  Emergency Medicine  [facility]       Jules Thomas MD  04/24/23 0802

## 2023-04-24 NOTE — ED TRIAGE NOTES
"Pt here d/t left flank pain that radiates around to front \"to the tip of my penis\". Decreased urine output. Pt states he has a history of kidney stones. Last year. Was told he has some that had not yet passed. VSS.      Triage Assessment     Row Name 04/24/23 0201       Triage Assessment (Adult)    Airway WDL WDL              "

## 2023-04-24 NOTE — ED PROVIDER NOTES
"EMERGENCY DEPARTMENT ENCOUNTER      NAME: Felix Costa  AGE: 32 year old male  YOB: 1990  MRN: [id number]  EVALUATION DATE & TIME: 4/24/2023  2:03 AM    PCP: Sarabjit Carter    ED PROVIDER: Jeremi Doherty MD      Chief Complaint   Patient presents with     Flank Pain     left     FINAL IMPRESSION:  1. Ureterolithiasis    2. Hypothyroidism, unspecified type        ED COURSE & MEDICAL DECISION MAKING:    Pertinent Labs & Imaging studies reviewed. (See chart for details)  32 year old male presents to the Emergency Department for evaluation of left-sided flank pain.  Patient reports 1 to 2-day history of left-sided flank pain rating to the \"tip of his penis\".  Symptoms similar to when he had a previous obstructing ureteral stone.  Patient presenting with significant discomfort.  No reported fevers.  Also relating that he is not currently taking his thyroid medication due to it causing hair loss.  Patient with a history of Graves' disease previously treated then subsequently started on thyroid supplementation but noncompliant with medication.  On examination vitals noted to be normal.  Patient with left-sided abdominal and left flank discomfort to examination.  Clinical examination otherwise unremarkable.  Probable obstructing ureteral stone.  No constitutional symptoms to suggest infection.  I have recommended pain control urinalysis and imaging for further evaluation as its been sometime since his last stone.  I did see benefit to obtaining thyroid testing as well as the patient has not had any testing or taking any medication for the last year.    3:34 AM  Patient's pain is improving.  CT scan demonstrating 3 mm stone with hydronephrosis.  Consistent with patient's acute source of pain.  No constitutional symptoms suggest infection afebrile here.  Awaiting urinalysis.  If urinalysis is negative anticipate discharge home with [facility] follow-up for acute obstructing kidney stone.  In addition, " patient presenting with noncompliance with his hypothyroid medication.  Noted to have a significantly elevated TSH and a depressed T4.  Vital signs are stable.  I recommended restarting patient's medication.  He received dose of Synthroid here in the emergency department and will be discharged on a 2-week supply of levothyroxine with plans for follow-up with his endocrinologist within the next several days to discuss restarting in a long-term prescription for his medication.  Anticipate discharge so long as urinalysis is negative.     Medical Decision Making    History:    Supplemental history from: Documented in chart, if applicable    External Record(s) reviewed: Documented in chart, if applicable.    Work Up:    Chart documentation includes differential considered and any EKGs or imaging independently interpreted by provider, where specified.    In additional to work up documented, I considered the following work up: Documented in chart, if applicable.    External consultation:    Discussion of management with another provider: Documented in chart, if applicable    Complicating factors:    Care impacted by chronic illness: Chronic Pain, Mental Health, Smoking / Nicotine Use and Other: Grave's disease    Care affected by social determinants of health: N/A    Disposition considerations: Discharge. I prescribed additional prescription strength medication(s) as charted. See documentation for any additional details.    2:05 AM I met with the patient, obtained history, performed an initial exam, and discussed options and plan for diagnostics and treatment here in the ED.      At the conclusion of the encounter I discussed the results of all of the tests and the disposition. The questions were answered. The patient or family acknowledged understanding and was agreeable with the care plan.       MEDICATIONS GIVEN IN THE EMERGENCY:  Medications   0.9% sodium chloride BOLUS (1,000 mLs Intravenous $New Bag 4/24/23 0246)    HYDROmorphone (PF) (DILAUDID) injection 0.5 mg (has no administration in time range)   levothyroxine (SYNTHROID/LEVOTHROID) tablet 125 mcg (has no administration in time range)   ketorolac (TORADOL) injection 15 mg (15 mg Intravenous $Given 4/24/23 0224)   HYDROmorphone (DILAUDID) injection 1 mg (1 mg Intravenous $Given 4/24/23 0225)   ondansetron (ZOFRAN) injection 4 mg (4 mg Intravenous $Given 4/24/23 0225)       NEW PRESCRIPTIONS STARTED AT TODAY'S ER VISIT  New Prescriptions    ACETAMINOPHEN (TYLENOL) 500 MG TABLET    Take 2 tablets (1,000 mg) by mouth every 6 hours for 7 days    DIMENHYDRINATE (DRAMAMINE) 50 MG TABLET    Take 1 tablet (50 mg) by mouth At Bedtime for 7 days    DIMENHYDRINATE (DRAMAMINE) 50 MG TABLET    Take 1 tablet (50 mg) by mouth every 6 hours as needed for other (kidney stone pain management)    IBUPROFEN (ADVIL/MOTRIN) 200 MG TABLET    Take 2 tablets (400 mg) by mouth every 6 hours for 7 days    LEVOTHYROXINE (SYNTHROID/LEVOTHROID) 125 MCG TABLET    Take 1 tablet (125 mcg) by mouth daily for 14 days    OXYCODONE (ROXICODONE) 5 MG TABLET    Take 1 tablet (5 mg) by mouth every 4 hours as needed for severe pain If pain is not improved with acetaminophen and ibuprofen.          =================================================================    HPI    Patient information was obtained from: patient    Use of : N/A        Felix Costa is a 32 year old male with a pertinent history of Grave's disease, nephrolithiasis, and acute low back pain without sciatica who presents to this ED by walk in for evaluation of flank pain.    Patient reports that he developed throbbing, left flank pain yesterday that radiates into his penis. He has a history of kidney stones and says that the pain feels similar. He says the pain is so severe that he cannot move. The patient also endorses retention and is unable to urinate. He has a history of Grave's disease and has undergone radiation to treat  "this. However, patient says that he has been told his thyroid grew back. He is supposed to be taking thyroid medication but has not been taking it for about 1 year. He does follow with endocrinology. No other complaints at this time.       REVIEW OF SYSTEMS   Review of Systems   Genitourinary: Positive for difficulty urinating (retention), flank pain (left) and penile pain.   All other systems reviewed and are negative.       PAST MEDICAL HISTORY:  Past Medical History:   Diagnosis Date     Graves disease 06/2016     Kidney stone 06/2016       PAST SURGICAL HISTORY:  Past Surgical History:   Procedure Laterality Date     NO PAST SURGERIES             CURRENT MEDICATIONS:    acetaminophen (TYLENOL) 500 MG tablet  dimenhyDRINATE (DRAMAMINE) 50 MG tablet  dimenhyDRINATE (DRAMAMINE) 50 MG tablet  ibuprofen (ADVIL/MOTRIN) 200 MG tablet  levothyroxine (SYNTHROID/LEVOTHROID) 125 MCG tablet  oxyCODONE (ROXICODONE) 5 MG tablet  ondansetron (ZOFRAN) 4 MG tablet  SYNTHROID 125 MCG tablet        ALLERGIES:  Allergies   Allergen Reactions     Peanut [Peanut Oil] Hives     Prochlorperazine Anxiety       FAMILY HISTORY:  Family History   Problem Relation Age of Onset     Diabetes Paternal Grandmother        SOCIAL HISTORY:   Social History     Socioeconomic History     Marital status: Single     Spouse name: None     Number of children: 1     Years of education: None     Highest education level: None   Tobacco Use     Smoking status: Every Day   Substance and Sexual Activity     Alcohol use: No     Drug use: No       VITALS:  /68   Pulse 57   Temp 98.7  F (37.1  C) (Temporal)   Resp 16   Ht 1.778 m (5' 10\")   Wt 80.7 kg (178 lb)   SpO2 98%   BMI 25.54 kg/m      PHYSICAL EXAM    PHYSICAL EXAM    Constitutional: Well developed, Well nourished, NAD  HENT: Normocephalic, Atraumatic, Bilateral external ears normal, Oropharynx normal, mucous membranes moist, Nose normal. Neck-  Normal range of motion, No tenderness, Supple, " No stridor.   Eyes: PERRL, EOMI, Conjunctiva normal, No discharge.   Respiratory: Normal breath sounds, No respiratory distress, No wheezing, Speaks full sentences easily. No cough.   Cardiovascular: Normal heart rate, Regular rhythm, No murmurs Chest wall nontender.    GI: Left-sided abdominal pain to palpation and left flank pain to palpation no guarding or peritoneal signs no appreciable distention.  : No cva tenderness    Musculoskeletal: 2+ DP pulses. No edema. No cyanosis. Good range of motion in all major joints. No tenderness to palpation. No tenderness of the CTLS spine.   Integument: Warm, Dry, No erythema, No rash. No petechiae.   Neurologic: Alert & oriented x 3, Normal motor function, Normal sensory function, No focal deficits noted.   Psychiatric: Affect normal, Judgment normal, Mood normal. Cooperative.     LAB:  All pertinent labs reviewed and interpreted.  Results for orders placed or performed during the hospital encounter of 04/24/23   CT Abdomen Pelvis w/o Contrast    Impression    IMPRESSION:   1.  Mild left hydronephrosis caused by a 3 mm ureterovesical junction stone.  2.  Few bilateral intrarenal stones.     Basic metabolic panel   Result Value Ref Range    Sodium 140 136 - 145 mmol/L    Potassium 4.2 3.4 - 5.3 mmol/L    Chloride 103 98 - 107 mmol/L    Carbon Dioxide (CO2) 27 22 - 29 mmol/L    Anion Gap 10 7 - 15 mmol/L    Urea Nitrogen 15.0 6.0 - 20.0 mg/dL    Creatinine 1.29 (H) 0.67 - 1.17 mg/dL    Calcium 9.5 8.6 - 10.0 mg/dL    Glucose 100 (H) 70 - 99 mg/dL    GFR Estimate 75 >60 mL/min/1.73m2   TSH with free T4 reflex   Result Value Ref Range    TSH 39.71 (H) 0.30 - 4.20 uIU/mL   CBC with platelets and differential   Result Value Ref Range    WBC Count 8.9 4.0 - 11.0 10e3/uL    RBC Count 4.65 4.40 - 5.90 10e6/uL    Hemoglobin 15.0 13.3 - 17.7 g/dL    Hematocrit 44.6 40.0 - 53.0 %    MCV 96 78 - 100 fL    MCH 32.3 26.5 - 33.0 pg    MCHC 33.6 31.5 - 36.5 g/dL    RDW 13.2 10.0 - 15.0 %     Platelet Count 253 150 - 450 10e3/uL    % Neutrophils 66 %    % Lymphocytes 24 %    % Monocytes 7 %    % Eosinophils 2 %    % Basophils 1 %    % Immature Granulocytes 0 %    NRBCs per 100 WBC 0 <1 /100    Absolute Neutrophils 5.9 1.6 - 8.3 10e3/uL    Absolute Lymphocytes 2.1 0.8 - 5.3 10e3/uL    Absolute Monocytes 0.6 0.0 - 1.3 10e3/uL    Absolute Eosinophils 0.2 0.0 - 0.7 10e3/uL    Absolute Basophils 0.1 0.0 - 0.2 10e3/uL    Absolute Immature Granulocytes 0.0 <=0.4 10e3/uL    Absolute NRBCs 0.0 10e3/uL   Result Value Ref Range    Free T4 0.84 (L) 0.90 - 1.70 ng/dL       RADIOLOGY:  Reviewed all pertinent imaging. Please see official radiology report.  CT Abdomen Pelvis w/o Contrast   Final Result   IMPRESSION:    1.  Mild left hydronephrosis caused by a 3 mm ureterovesical junction stone.   2.  Few bilateral intrarenal stones.           I, Libby Parikh, am serving as a scribe to document services personally performed by Jeremi Doherty based on my observation and the provider's statements to me. I, Jeremi Doherty, attest that Libby Parikh is acting in a scribe capacity, has observed my performance of the services and has documented them in accordance with my direction.    Jeremi Doherty MD  [facility] EMERGENCY DEPARTMENT  [address] [place] [id number]  [phone]     Jeremi Doherty MD  04/24/23 8940

## 2023-04-24 NOTE — Clinical Note
Felix Costa was seen and treated in our emergency department on 4/24/2023.  He may return to work on 04/26/2023.       If you have any questions or concerns, please don't hesitate to call.      Jules Thomas MD

## 2023-04-27 ENCOUNTER — VIRTUAL VISIT (OUTPATIENT)
Dept: UROLOGY | Facility: CLINIC | Age: 33
End: 2023-04-27

## 2023-04-27 DIAGNOSIS — N20.1 URETEROLITHIASIS: ICD-10-CM

## 2023-04-27 DIAGNOSIS — N20.1 CALCULUS OF URETER: Primary | ICD-10-CM

## 2023-04-27 PROCEDURE — 99204 OFFICE O/P NEW MOD 45 MIN: CPT | Mod: VID | Performed by: UROLOGY

## 2023-04-27 NOTE — PROGRESS NOTES
Patient is roomed via telephone for a telehealth visit.  Patient confirmed he is in the [facility] at the time of this appointment.  Patient understand that this visit is billable and agree to proceed with appointment.

## 2023-04-27 NOTE — PATIENT INSTRUCTIONS
Patient Stated Goal: Pass my stone  Symptom Control While Passing A Stone    The goal of [facility] is to let a smaller kidney stone (less than 4 to 5 mm) pass without intervention if possible. Giving your body a chance to clear the stone may take a few hours up to a few weeks.  Keeping you well-informed, safe and fairly comfortable is important.    Drink to thirst  Do not attempt to  flush out  your stone by drinking too much fluid. This does not work and may increase nausea. Drink enough to satisfy your body s thirst. Eating your normal diet is fine.   Medications (that may be suggested or prescribed)    Ibuprofen (Advil or Motrin) Available over the counter  o Take two (200mg) tablets every six hours until the stone passes.  o Prevents spasm of the ureter.    o Decreases pain.      Dramamine* (drowsy version, non-generic formulation) Available over the counter  o Take 50mg at bedtime  o Decreases spasms of the ureter  o Decreases nausea  o Decreases acute pain  o Decreases recurrence of pain for 24 hours  o Will help you sleep  *This medication will cause increase drowsiness, do not drive or operate machinery for 6 hours.      Narcotics (Percocet, Vicodin, Dilaudid) Take as prescribed for severe pain unrelieved by ibuprofen and Dramamine  o Narcotics have significant side effects and only  cover-up  pain. They have no effect on the cause of pain.  o Common side effects  - Confusion, disorientation and sedation - DO NOT DRIVE OR OPERATE MACHINERY WITHIN 24 HOURS  - Nausea - take Dramamine or Zofran or Haldol to help control  - Constipation  - Sleep disturbances      Ondansetron (Zofran) Take as prescribed  o Reserve for severe nausea  o May cause constipation, start over the counter Miralax if needed      Second Line Anti-Nausea Medication: Adding a different anti-nausea medication maybe helpful for persistent nausea.  The combined effect of different types of anti-nausea medications maybe more  effective than either medication by itself, even in higher doses.  o Compazine: Take as prescribed      Information about kidney stones    Crystals can form if chemicals are too concentrated in your urine. If the crystal grows over time, a stone may form. A stone usually isn t painful while it is still in the kidney.    As the stone begins to leave the kidney, you may experience episodes of flank pain as the kidney stone approaches the entrance to the ureter. Some people feel a vague ache in the side.    Kidney stones may fall into the ureter. Some stones are tiny and pass through without causing symptoms. The ureter is a small tube (approximately 1/8 of an inch wide). A kidney stone can get stuck and block the ureter. If this happens, urine backs up and flows back to the kidney. Back pressure on the kidney can cause:  o Severe flank pain radiating to the groin.  o Severe nausea and vomiting.  o The pain can occur in the lower back, side, groin or all three.      When the stone reaches the lower ureter, this can irritate the bladder and sensations of feeling the urge to urinate frequently and urgently may occur.      Once the stone passes out of your ureter and into your bladder, the symptoms of urgency and frequency will often disappear. Sometimes pain will come back for a short period and will not be as severe as before. The passage of the stone from your bladder and out of your body is usually not a problem. The urethra is at least twice as wide as the normal ureter, so the stone doesn t usually block it.    Strain all urine  If you pass the stone, save it. Place it in the container we have provided and bring it to the [facility] within a week of passing it. Your stone will then be sent for analysis which takes about a month.     Signs and symptoms you might experience    Nausea    Decreased appetite    Urinary frequency    Bloody urine     Chills    Fatigue    When to call [facility] or  go to the Emergency Room    Fever with a temperature greater than 100.1    Severe pain    Persistent nausea/vomiting    If the pain worsens or nausea/vomiting is uncontrolled with medications, STOP eating & drinking. You need to have an empty stomach for 8 hours prior to surgery. Call the [facility] immediately at [phone].           Follow-up    Low dose CT scan with doctor visit 1-2 weeks after initial clinic visit per doctor s instructions    Please cancel the CT scan visit if you pass a stone. Reschedule for a one month follow-up with doctor to discuss stone composition and future prevention.    Preventing future stones    Approximately a month after your stone is sent out for analysis, a prevention visit will occur with your provider, to discuss an individualized plan for prevention of new stones and to discuss managing stones that you may still have. Along with the analysis of the kidney stone, blood and urine tests may be indicated to develop this plan. Knowing the type of kidney stones you make, and why, allows the providers at the [facility] to recommend specific ways to prevent them.    Follow-up visits are an important part of monitoring and preventing future re-occurrences.    The [facility] is available for questions or concerns 24 hours a day at [phone]

## 2023-04-27 NOTE — PROGRESS NOTES
Assessment/Plan:    Assessment & Plan   Felix was seen today for new patient.    Diagnoses and all orders for this visit:    Calculus of ureter  -     Patient Stated Goal: Pass my stone  -     CT Abdomen Pelvis w/o Contrast; Future    Ureterolithiasis  -     Adult Urology Referral        Stone Management Plan      6/6/2022     3:00 PM 4/27/2023     1:00 PM   Stone Management   Urinary Tract Infection No suspicion of infection No suspicion of infection   Renal Colic Asymptomatic at this time Well controlled symptoms   Renal Failure No suspicion of renal failure No suspicion of renal failure   Current CT date 6/2/2022 4/24/2023   Right sided stones? Yes Yes   R Number of ureteral stones 1 No ureteral stones   R GSD of ureteral stones 1    R Location of ureteral stone Distal    R Number of kidney stones  5 5   R GSD of kidney stones 2 - 4 < 2   R Hydronephrosis Mild None   R Stone Event New stone passed prior to visit No current event   Diagnosis date 6/2/2022    Initial location of primary symptomatic stone Distal    Initial GSD of primary symptomatic stone 1    Resolved Date 6/6/2022    R Current Plan Observe    Observe rationale Limited stone burden with good prognosis for spontaneous passage    Left sided stones? Yes Yes   L Number of ureteral stones No ureteral stones 1   L GSD of ureteral stones  3   L Location of ureteral stone  Distal   L Number of kidney stones  3 2   L GSD of kidney stones < 2 < 2   L Hydronephrosis None Mild   L Stone Event No current event New event   Diagnosis date  4/24/2023   Initial location of primary symptomatic stone  Distal   Initial GSD of primary symptomatic stone  3   L MET Status  Initiation   L Current Plan Observe MET   MET  2 week F/U   Observe rationale Limited stone burden with good prognosis for spontaneous passage            PLAN    Will proceed with medical expulsive therapy. Risks and benefits were detailed of medical expulsive therapy including probability of stone  passage, recurrent renal colic, and requirement of emergency medical and/or surgical care and further imaging. Patient verbalized understanding. Patient agrees with plan as discussed. He will return in 2 weeks with low dose CT scan.    Over the counter symptom control medications of ibuprofen, Dramamine and Tylenol were recommended.    Phone call duration: 8 minutes  Patient location in [place]: Home  Distant site (provider site): Remote  13 minutes spent by me on the date of the encounter doing chart review, history and exam, documentation and further activities per the note    ASAF BARRETT MD  [facility]    Subjective:     HPI  Mr. Felix Costa is a 32 year old  male who is being evaluated via a billable telephone visit by [facility] new stone episode.    He is a first time  stone former who has not required stone clearance procedures.     Presented with acute left flank pain. No fever or chills. Not much for voiding symptoms. He has continued to have abdominal pain despite taking oxycodone on a regular basis. He has been under-dosing with ibuprofen and tylenol.    CT scan is personally reviewed and demonstrates a 3 mm left distal stone and multiple bilateral papillary tip calcifications..    Significant labs from presentation below.    Will arrange imaging follow up in a couple of weeks if he has not already passed stone.    ROS   Review of systems is negative except for HPI    Past Medical History:   Diagnosis Date     Graves disease 06/2016     Kidney stone 06/2016       Past Surgical History:   Procedure Laterality Date     NO PAST SURGERIES         Current Outpatient Medications   Medication Sig Dispense Refill     acetaminophen (TYLENOL) 500 MG tablet Take 2 tablets (1,000 mg) by mouth every 6 hours for 7 days 56 tablet 0     dimenhyDRINATE (DRAMAMINE) 50 MG tablet Take 1 tablet (50 mg) by mouth At Bedtime for  7 days 7 tablet 0     dimenhyDRINATE (DRAMAMINE) 50 MG tablet Take 1 tablet (50 mg) by mouth every 6 hours as needed for other (kidney stone pain management) 28 tablet 0     ibuprofen (ADVIL/MOTRIN) 200 MG tablet Take 2 tablets (400 mg) by mouth every 6 hours for 7 days 56 tablet 0     levothyroxine (SYNTHROID/LEVOTHROID) 125 MCG tablet Take 1 tablet (125 mcg) by mouth daily for 14 days 14 tablet 0     ondansetron (ZOFRAN) 4 MG tablet [ONDANSETRON (ZOFRAN) 4 MG TABLET] Take 1 tablet (4 mg total) by mouth every 6 (six) hours. 12 tablet 0     oxyCODONE (ROXICODONE) 5 MG tablet Take 1 tablet (5 mg) by mouth every 4 hours as needed for severe pain If pain is not improved with acetaminophen and ibuprofen. 12 tablet 0     SYNTHROID 125 MCG tablet          Allergies   Allergen Reactions     Peanut [Peanut Oil] Hives     Prochlorperazine Anxiety       Social History     Socioeconomic History     Marital status: Single     Spouse name: Not on file     Number of children: 1     Years of education: Not on file     Highest education level: Not on file   Occupational History     Not on file   Tobacco Use     Smoking status: Every Day     Smokeless tobacco: Not on file   Vaping Use     Vaping status: Not on file   Substance and Sexual Activity     Alcohol use: No     Drug use: No     Sexual activity: Not on file   Other Topics Concern     Not on file   Social History Narrative     Not on file     Social Determinants of Health     Financial Resource Strain: Not on file   Food Insecurity: Not on file   Transportation Needs: Not on file   Physical Activity: Not on file   Stress: Not on file   Social Connections: Not on file   Intimate Partner Violence: Not on file   Housing Stability: Not on file       Family History   Problem Relation Age of Onset     Diabetes Paternal Grandmother        Objective:     No vitals or physical exam obtained due to virtual visit    Labs  Most Recent 3 CBC's:Recent Labs   Lab Test 04/24/23  9704  06/02/22  1413 01/06/22  1540   WBC 8.9 12.6* 4.3   HGB 15.0 15.2 15.4   MCV 96 97 95    287 218     Most Recent 3 BMP's:Recent Labs   Lab Test 04/24/23  0214 06/02/22  1413 01/06/22  1540    139 141   POTASSIUM 4.2 3.7 3.9   CHLORIDE 103 105 105   CO2 27 26 28   BUN 15.0 15 8   CR 1.29* 1.19 1.19   ANIONGAP 10 8 8   ABDIRASHID 9.5 9.8 9.1   * 92 88     Most Recent Urinalysis:Recent Labs   Lab Test 04/24/23  0412   COLOR Light Yellow   APPEARANCE Clear   URINEGLC Negative   URINEBILI Negative   URINEKETONE Negative   SG 1.025   UBLD >1.0 mg/dL*   URINEPH 5.5   PROTEIN Negative   NITRITE Negative   LEUKEST Negative   RBCU 77*   WBCU 3     Acute Labs Urine Culture  No results found for: CULTURE

## 2023-07-27 ENCOUNTER — HOSPITAL ENCOUNTER (EMERGENCY)
Facility: HOSPITAL | Age: 33
Discharge: HOME | End: 2023-07-27
Attending: EMERGENCY MEDICINE | Admitting: EMERGENCY MEDICINE

## 2023-07-27 ENCOUNTER — APPOINTMENT (OUTPATIENT)
Dept: RADIOLOGY | Facility: HOSPITAL | Age: 33
End: 2023-07-27
Attending: EMERGENCY MEDICINE

## 2023-07-27 VITALS
RESPIRATION RATE: 16 BRPM | OXYGEN SATURATION: 100 % | HEART RATE: 63 BPM | DIASTOLIC BLOOD PRESSURE: 79 MMHG | SYSTOLIC BLOOD PRESSURE: 119 MMHG | HEIGHT: 70 IN | BODY MASS INDEX: 25.54 KG/M2 | TEMPERATURE: 98.9 F

## 2023-07-27 DIAGNOSIS — M54.6 ACUTE LEFT-SIDED THORACIC BACK PAIN: ICD-10-CM

## 2023-07-27 PROCEDURE — 71046 X-RAY EXAM CHEST 2 VIEWS: CPT

## 2023-07-27 PROCEDURE — 250N000013 HC RX MED GY IP 250 OP 250 PS 637: Performed by: EMERGENCY MEDICINE

## 2023-07-27 PROCEDURE — 99283 EMERGENCY DEPT VISIT LOW MDM: CPT | Mod: 25

## 2023-07-27 PROCEDURE — 72072 X-RAY EXAM THORAC SPINE 3VWS: CPT

## 2023-07-27 RX ORDER — CYCLOBENZAPRINE HCL 10 MG
10 TABLET ORAL ONCE
Status: COMPLETED | OUTPATIENT
Start: 2023-07-27 | End: 2023-07-27

## 2023-07-27 RX ORDER — CYCLOBENZAPRINE HCL 10 MG
10 TABLET ORAL 3 TIMES DAILY PRN
Qty: 20 TABLET | Refills: 0 | Status: SHIPPED | OUTPATIENT
Start: 2023-07-27 | End: 2023-08-03

## 2023-07-27 RX ORDER — IBUPROFEN 200 MG
400 TABLET ORAL ONCE
Status: COMPLETED | OUTPATIENT
Start: 2023-07-27 | End: 2023-07-27

## 2023-07-27 RX ADMIN — CYCLOBENZAPRINE 10 MG: 10 TABLET, FILM COATED ORAL at 21:19

## 2023-07-27 RX ADMIN — IBUPROFEN 400 MG: 200 TABLET, FILM COATED ORAL at 21:19

## 2023-07-27 NOTE — Clinical Note
Felix Costa was seen and treated in our emergency department on 7/27/2023.  He may return to work on 07/31/2023.  Patient should be on light duty without lifting until he follows up with the spine clinic.      If you have any questions or concerns, please don't hesitate to call.      Sheila Elmore MD

## 2023-07-28 NOTE — ED PROVIDER NOTES
EMERGENCY DEPARTMENT ENCOUNTER      NAME: Felix Costa  AGE: 33 year old male  YOB: 1990  MRN: [id number]  EVALUATION DATE & TIME: No admission date for patient encounter.    PCP: Sarabjit Carter    ED PROVIDER: Sheila Elmore MD      Chief Complaint   Patient presents with    Back Pain         FINAL IMPRESSION:  1. Acute left-sided thoracic back pain          ED COURSE & MEDICAL DECISION MAKING:    Pertinent Labs & Imaging studies reviewed. (See chart for details)    8:10 PM I introduced myself to the patient, obtained patient history, performed a physical exam, and discussed plan for ED workup including potential diagnostic laboratory/imaging studies and interventions.    33 year old male presents to the Emergency Department for evaluation of thoracic back pain and left chest wall pain after bracing a washer/dryer.  He was moving this washer and dryer when it slipped and he had to brace it from falling forward.  It did not strike him and he did not fall down.  However as he did this he noticed a spasm in his thoracic back wrapping around into his left chest wall and is had pain there since.  Does report some pain with inspiration with this.  Has some mild reproducible tenderness along the left chest wall but no skin changes or ecchymosis and again did not get hit with the washer dryer.  Also reporting some tingling down into the left shoulder area however is entirely neurologically intact with sensation intact and has no focal neurologic deficits.  Is neurovascularly intact in the left upper extremity and has no bony tenderness of the left upper extremity with normal range of motion of all the joints of the left upper extremity.  This felt that specific shoulder injury was less likely as his main complaint is pain in the thoracic back and into the chest wall.  As he was not struck with this do feel that fracture is less likely however will obtain chest x-ray to further evaluate and also  include evaluation for pneumothorax.  Also will do x-ray of the thoracic spine.  He has no midline tenderness of the cervical, thoracic, or lumbar spine and again overall have low suspicion for fracture.  This sounds like a likely muscle spasm and potentially could have had a herniated disc in the back causing this radiation.  As he has no focal neurologic deficits felt that spinal cord compression is very unlikely.  He also has no red flag symptoms of this.  Did give the patient oral ibuprofen and oral Flexeril.  He had not taken anything for pain at home.  He is overall well-appearing and in no acute distress.  No signs of respiratory distress.  Does not otherwise have chest pain other than this were reproducible radiation discomfort from the back.  With this incident occurring again felt that PE would be very unlikely and he is PERC negative.  Also has no lower back pain or tenderness.  With this location and presentation felt like kidney stone be very unlikely.  Chest x-ray is unremarkable without sign of rib fracture or pneumothorax or other acute pathology.  X-ray of the thoracic spine is also unremarkable.  Do feel this is likely muscle spasm related to this injury.  He was feeling much improved with medications.  He was advised to continue use Tylenol ibuprofen at home and was also prescribed Flexeril as needed for muscle relaxer.  He was referred to the spine clinic as needed if not improving also discussed he could follow-up with his primary care provider.  He was given a work note.  He was given indications for return emergency department.  He voiced understanding was comfortable with this plan.  He was discharged home in stable condition.         At the conclusion of the encounter I discussed the results of all of the tests and the disposition. The questions were answered. The patient or family acknowledged understanding and was agreeable with the care plan.         Medical Decision  Making    History:  Supplemental history from: Documented in chart, if applicable  External Record(s) reviewed: Documented in chart, if applicable.    Work Up:  Chart documentation includes differential considered and any EKGs or imaging independently interpreted by provider.  In additional to work up documented, I considered the following work up: Documented in chart, if applicable.    External consultation:  Discussion of management with another provider: Documented in chart, if applicable    Complicating factors:  Care impacted by chronic illness: Mental Health and Other: Graves' disease, hypothyroidism  Care affected by social determinants of health: Access to Affordable Health Care    Disposition considerations: Discharge. I prescribed additional prescription strength medication(s) as charted. See documentation for any additional details.      MEDICATIONS GIVEN IN THE EMERGENCY:  Medications   ibuprofen (ADVIL/MOTRIN) tablet 400 mg (400 mg Oral $Given 7/27/23 2119)   cyclobenzaprine (FLEXERIL) tablet 10 mg (10 mg Oral $Given 7/27/23 2119)       NEW PRESCRIPTIONS STARTED AT TODAY'S ER VISIT  Discharge Medication List as of 7/27/2023 10:03 PM        START taking these medications    Details   cyclobenzaprine (FLEXERIL) 10 MG tablet Take 1 tablet (10 mg) by mouth 3 times daily as needed for muscle spasms, Disp-20 tablet, R-0, E-Prescribe                =================================================================    HPI    Patient information was obtained from: Patient     Use of : N/A         Felix Costa is a 33 year old male with a pertinent medical history of Graves' disease, hypothyroidism, lower back pain, kidney stone, and anxiety who presents to this ED for evaluation of back pain.    Patient reports that 6 days ago while at work he was moving an approximately 500 pound 2-in-1 washer/dryer off of a truck when it then tipped forward. He notes he caught the 2-in-1 washer/dryer while it  was tipping over, but he mentions that in doing so he tightened his body and immediately developed left-sided back pain/spasm. He denies any falls with the incident, and he denies the washer/dryer falling on him or crushing him. He notes that after developing his left-sided back pain he then attempted to raise his left arm in the air, however, doing so provoked sharp left flank pain as well as immediate shortness of breath. He endorses approximately one week worth of left-sided back pain, primarily near the left shoulder blade, that wraps around to his left ribs and to the left-side of his chest, as well as up to his left bicep and left elbow. He endorses associated tingling at his left bicep and left elbow. He denies any recent neck pain. He denies any history of back injuries or neck injuries. He denies taking any medications for his symptoms. He denies any recent gait problem, abdominal pain, nausea, vomiting, numbness, weakness, saddle anesthesia, bowel or bladder incontinence, cough, fever, vomiting, or any other complications at this time. He reports this does not feel like his prior kidney stone. No urinary symptoms.       REVIEW OF SYSTEMS   Review of Systems   Pertinent positives and negatives are documented in the HPI. All other systems reviewed and are negative.      PAST MEDICAL HISTORY:  Past Medical History:   Diagnosis Date    Graves disease 06/2016    Kidney stone 06/2016       PAST SURGICAL HISTORY:  Past Surgical History:   Procedure Laterality Date    NO PAST SURGERIES             CURRENT MEDICATIONS:    levothyroxine (SYNTHROID/LEVOTHROID) 125 MCG tablet  ondansetron (ZOFRAN) 4 MG tablet  SYNTHROID 125 MCG tablet        ALLERGIES:  Allergies   Allergen Reactions    Peanut [Peanut Oil] Hives    Prochlorperazine Anxiety       FAMILY HISTORY:  Family History   Problem Relation Age of Onset    Diabetes Paternal Grandmother        SOCIAL HISTORY:   Social History     Socioeconomic History    Marital  "status: Single    Number of children: 1   Tobacco Use    Smoking status: Every Day   Substance and Sexual Activity    Alcohol use: No    Drug use: No       VITALS:  /79   Pulse 63   Temp 98.9  F (37.2  C) (Temporal)   Resp 16   Ht 1.778 m (5' 10\")   SpO2 100%   BMI 25.54 kg/m      PHYSICAL EXAM    Physical Exam  Constitutional: Well developed, Well nourished, NAD, GCS 15  HENT: Normocephalic, Atraumatic, Bilateral external ears normal, Oropharynx normal, mucous membranes moist, Nose normal. Neck- Normal range of motion, No tenderness, no midline cervical spine tenderness, Supple, No stridor.    Eyes: PERRL, EOMI, Conjunctiva normal, No discharge.   Respiratory: Normal breath sounds, No respiratory distress, No wheezing or crackles, Speaks in full sentences easily.    Cardiovascular: Normal heart rate, Regular rhythm, No murmurs, No rubs, No gallops. 2+ radial pulses bilaterally. Mild left lateral chest wall tenderness. No crepitus. No ecchymosis or skin changes.   GI: Bowel sounds normal, Soft, No tenderness, No masses, No rebound or guarding. No CVA tenderness bilaterally    Musculoskeletal: 2+ DP pulses. No notable lower extremity edema. No cyanosis, No clubbing. Good range of motion in all major joints. No tenderness to palpation of the extremities or major deformities noted. No midline tenderness of the CTLS spine. Some paraspinous thoracic muscle tenderness on the left. No bony tenderness of the left shoulder or upper extremity. Normal range of motion of all joints of the left upper extremity. Compartments are soft and compressible.   Integument: Warm, Dry, No erythema, No rash. No petechiae.   Neurologic: Alert & oriented x 3, 5/5 strength in all 4 extremities bilaterally including triceps, biceps, and  strength bilaterally. Sensation intact to light touch in all 4 extremities including all dermatomes in the upper and lower extremities. No focal deficits noted. Normal gait.     Psychiatric: " Affect normal, Judgment normal, Mood normal. Cooperative.      LAB:  All pertinent labs reviewed and interpreted.  Results for orders placed or performed during the hospital encounter of 07/27/23   Chest XR,  PA & LAT    Impression    IMPRESSION: Heart is normal in size. Lungs are clear.   Thoracic spine XR, 3 views    Impression    IMPRESSION:     The upper thoracic vertebral bodies near the cervicothoracic junction are not well evaluated in the lateral projection due to superimposed structures.  The remaining visualized thoracic vertebral bodies are unremarkable in height.    Alignment is unremarkable.    Intervertebral disc spaces appear maintained.    Pedicles appear symmetric.    Visualized lung fields are well aerated.       RADIOLOGY:  Reviewed all pertinent imaging. Please see official radiology report.  Thoracic spine XR, 3 views   Final Result   IMPRESSION:       The upper thoracic vertebral bodies near the cervicothoracic junction are not well evaluated in the lateral projection due to superimposed structures.  The remaining visualized thoracic vertebral bodies are unremarkable in height.      Alignment is unremarkable.      Intervertebral disc spaces appear maintained.      Pedicles appear symmetric.      Visualized lung fields are well aerated.      Chest XR,  PA & LAT   Final Result   IMPRESSION: Heart is normal in size. Lungs are clear.            PROCEDURES:   None.       [facility] Documentation:   CMS Diagnoses:               I, Kai Hoang, am serving as a scribe to document services personally performed by Sheila Elmore MD based on my observation and the provider's statements to me. I, Sheila Elmore MD, attest that Kai Hoang is acting in a scribe capacity, has observed my performance of the services and has documented them in accordance with my direction.    Sheila Elmore MD  [facility] EMERGENCY DEPARTMENT  [facility] [address]  [place] [address]  [phone]       Sheila Elmore MD  08/15/23 1053

## 2023-07-28 NOTE — DISCHARGE INSTRUCTIONS
Can use the Flexeril as needed for muscle spasms however this may make you sleepy so would not suggest that you take it while at work or if you are driving.  Can take ibuprofen and Tylenol for pain as well.  Can follow-up with the spine clinic or your primary care doctor for reevaluation if this does not improve on its own.  They may potentially do further imaging such as MRI if you continue to have symptoms.    Return to the ER if you develop severe uncontrolled pain, shortness of breath that worsens, severe chest pain, new numbness, weakness, or any new or worsening concerns.

## 2023-07-28 NOTE — ED TRIAGE NOTES
Patient arrives from home.   Was at work on Friday morning and lifting some heavy equipment off a truck when the equipment started to slip and he tried to brace the equipment and felt everything on his chest and back grow tight.   Since then he has had heavier chest, back and upper arm pain and states it hurts to take in deep breaths.     Denies any meds for symptoms.